# Patient Record
Sex: MALE | Race: BLACK OR AFRICAN AMERICAN | NOT HISPANIC OR LATINO | Employment: UNEMPLOYED | URBAN - NONMETROPOLITAN AREA
[De-identification: names, ages, dates, MRNs, and addresses within clinical notes are randomized per-mention and may not be internally consistent; named-entity substitution may affect disease eponyms.]

---

## 2018-09-30 ENCOUNTER — HOSPITAL ENCOUNTER (EMERGENCY)
Facility: HOSPITAL | Age: 33
Discharge: HOME | End: 2018-09-30
Admitting: EMERGENCY MEDICINE

## 2018-09-30 ENCOUNTER — APPOINTMENT (OUTPATIENT)
Dept: GENERAL RADIOLOGY | Facility: HOSPITAL | Age: 33
End: 2018-09-30

## 2018-09-30 VITALS
HEART RATE: 108 BPM | HEIGHT: 75 IN | TEMPERATURE: 98.6 F | OXYGEN SATURATION: 98 % | BODY MASS INDEX: 24.87 KG/M2 | SYSTOLIC BLOOD PRESSURE: 153 MMHG | WEIGHT: 200 LBS | DIASTOLIC BLOOD PRESSURE: 98 MMHG | RESPIRATION RATE: 15 BRPM

## 2018-09-30 DIAGNOSIS — S93.402A SPRAIN OF LEFT ANKLE, UNSPECIFIED LIGAMENT, INITIAL ENCOUNTER: Primary | ICD-10-CM

## 2018-09-30 PROCEDURE — 99283 EMERGENCY DEPT VISIT LOW MDM: CPT

## 2018-09-30 PROCEDURE — 73630 X-RAY EXAM OF FOOT: CPT

## 2018-09-30 PROCEDURE — 73610 X-RAY EXAM OF ANKLE: CPT

## 2018-09-30 RX ORDER — NAPROXEN 500 MG/1
500 TABLET ORAL 2 TIMES DAILY PRN
Qty: 20 TABLET | Refills: 0 | Status: SHIPPED | OUTPATIENT
Start: 2018-09-30

## 2018-09-30 RX ORDER — OXYCODONE HYDROCHLORIDE AND ACETAMINOPHEN 5; 325 MG/1; MG/1
1 TABLET ORAL EVERY 4 HOURS PRN
Qty: 12 TABLET | Refills: 0 | Status: SHIPPED | OUTPATIENT
Start: 2018-09-30

## 2018-10-02 NOTE — ED NOTES
Call placed to patient per directions of Sandi MELCHOR. Patient informed of tiny fracture at distal tibula and that patient should follow up at [facility]. Patient verbalized understanding.      Pepito Tan  10/02/18 0961

## 2018-10-03 ENCOUNTER — HOSPITAL ENCOUNTER (EMERGENCY)
Facility: HOSPITAL | Age: 33
Discharge: HOME | End: 2018-10-03
Admitting: NURSE PRACTITIONER

## 2018-10-03 VITALS
TEMPERATURE: 97.9 F | SYSTOLIC BLOOD PRESSURE: 136 MMHG | HEIGHT: 75 IN | WEIGHT: 233.2 LBS | DIASTOLIC BLOOD PRESSURE: 97 MMHG | OXYGEN SATURATION: 98 % | BODY MASS INDEX: 29 KG/M2 | HEART RATE: 114 BPM | RESPIRATION RATE: 16 BRPM

## 2018-10-03 DIAGNOSIS — S82.892A CLOSED AVULSION FRACTURE OF LEFT ANKLE, INITIAL ENCOUNTER: Primary | ICD-10-CM

## 2018-10-03 PROCEDURE — 99282 EMERGENCY DEPT VISIT SF MDM: CPT

## 2018-10-03 RX ORDER — KETOROLAC TROMETHAMINE 10 MG/1
10 TABLET, FILM COATED ORAL EVERY 6 HOURS PRN
Qty: 15 TABLET | Refills: 0 | Status: SHIPPED | OUTPATIENT
Start: 2018-10-03

## 2018-10-03 NOTE — DISCHARGE INSTRUCTIONS
Return to ER if symptoms worsen   Elevate/ ice     Ankle Exercises  Ask your health care provider which exercises are safe for you. Do exercises exactly as told by your health care provider and adjust them as directed. It is normal to feel mild stretching, pulling, tightness, or discomfort as you do these exercises, but you should stop right away if you feel sudden pain or your pain gets worse. Do not begin these exercises until told by your health care provider.  Stretching and range of motion exercises  These exercises warm up your muscles and joints and improve the movement and flexibility of your ankle. These exercises also help to relieve pain, numbness, and tingling.  Exercise A: Dorsiflexion/Plantar Flexion    1. Sit with your __________ knee straight or bent. Do not rest your foot on anything.  2. Flex your __________ ankle to tilt the top of your foot toward your shin.  3. Hold this position for __________ seconds.  4. Point your toes downward to tilt the top of your foot away from your shin.  5. Hold this position for __________ seconds.  Repeat __________ times. Complete this exercise __________ times a day.  Exercise B: Ankle Alphabet    1. Sit with your __________ foot supported at your lower leg.  ? Do not rest your foot on anything.  ? Make sure your foot has room to move freely.  2. Think of your __________ foot as a paintbrush, and move your foot to trace each letter of the alphabet in the air. Keep your hip and knee still while you trace. Make the letters as large as you can without increasing any discomfort.  3. Trace every letter from A to Z.  Repeat __________ times. Complete this exercise __________ times a day.  Exercise C: Ankle Dorsiflexion, Passive  1. Sit on a chair that is placed on a non-carpeted surface.  2. Place your __________ foot on the floor, directly under your __________ knee. Extend your __________ leg for support.  3. Keeping your heel down, slide your __________ foot back  toward the chair until you feel a stretch at your ankle or calf. If you do not feel a stretch, slide your buttocks forward to the edge of the chair.  4. Hold this stretch for __________ seconds.  Repeat __________ times. Complete this stretch __________ times a day.  Strengthening exercises  These exercises build strength and endurance in your ankle. Endurance is the ability to use your muscles for a long time, even after they get tired.  Exercise D: Dorsiflexors    1. Secure a rubber exercise band or tube to an object, such as a table leg, that will stay still when the band is pulled. Secure the other end around your __________ foot.  2. Sit on the floor, facing the object with your __________ leg extended. The band or tube should be slightly tense when your foot is relaxed.  3. Slowly flex your __________ ankle and toes to bring your foot toward you.  4. Hold this position for __________ seconds.  5. Slowly return your foot to the starting position, controlling the band as you do that.  Repeat __________ times. Complete this exercise __________ times a day.  Exercise E: Plantar Flexors    1. Sit on the floor with your __________ leg extended.  2. Loop a rubber exercise band or tube around the ball of your __________ foot. The ball of your foot is on the walking surface, right under your toes. The band or tube should be slightly tense when your foot is relaxed.  3. Slowly point your toes downward, pushing them away from you.  4. Hold this position for __________ seconds.  5. Slowly release the tension in the band or tube, controlling smoothly until your foot is back in the starting position.  Repeat __________ times. Complete this exercise __________ times a day.  Exercise F: Towel Curls    1. Sit in a chair on a non-carpeted surface, and put your feet on the floor.  2. Place a towel in front of your feet. If told by your health care provider, add __________ to the end of the towel.  3. Keeping your heel on the  floor, put your __________ foot on the towel.  4. Pull the towel toward you by grabbing the towel with your toes and curling them under. Keep your heel on the floor.  5. Let your toes relax.  6. Grab the towel again. Keep going until the towel is completely underneath your foot.  Repeat __________ times. Complete this exercise __________ times a day.  Exercise G: Heel Raise (  Plantar Flexors, Standing)  1. Stand with your feet shoulder-width apart.  2. Keep your weight spread evenly over the width of your feet while you rise up on your toes. Use a wall or table to steady yourself, but try not to use it for support.  3. If this exercise is too easy, try these options:  ? Shift your weight toward your __________ leg until you feel challenged.  ? If told by your health care provider, lift your uninjured leg off the floor.  4. Hold this position for __________ seconds.  Repeat __________ times. Complete this exercise __________ times a day.  Exercise H: Tandem Walking  1. Stand with one foot directly in front of the other.  2. Slowly raise your back foot up, lifting your heel before your toes, and place it directly in front of your other foot.  3. Continue to walk in this heel-to-toe way for __________ or for as long as told by your health care provider. Have a countertop or wall nearby to use if needed to keep your balance, but try not to hold onto anything for support.  Repeat __________ times. Complete this exercises __________ times a day.  This information is not intended to replace advice given to you by your health care provider. Make sure you discuss any questions you have with your health care provider.  Document Released: 11/01/2006 Document Revised: 08/17/2017 Document Reviewed: 09/04/2016  [employer]vier Interactive Patient Education © 2018 [employer]

## 2018-10-03 NOTE — ED PROVIDER NOTES
Subjective   Patient is a 33-year-old black male presents with persistent left ankle pain.  Patient was seen here on September 30 and x-ray revealed tiny nondisplaced fracture the tip of the distal fibula.  Patient has been in a walking boot since that time.  He states that he has tried to follow-up with orthopedics and the [facility] advised they would just put him in a boot and there was nothing else they could do.  He was unable to see a provider there at that time.  He states he is having more pain is not sure what else today.  He is here tonight to find out what else he can day.        History provided by:  Patient   used: No        Review of Systems   Constitutional: Negative.    HENT: Negative.    Eyes: Negative.    Respiratory: Negative.    Cardiovascular: Negative.    Gastrointestinal: Negative.    Endocrine: Negative.    Genitourinary: Negative.    Musculoskeletal:        Patient is a 33-year-old black male presents with persistent left ankle pain.  Patient was seen here on September 30 and x-ray revealed tiny nondisplaced fracture the tip of the distal fibula.  Patient has been in a walking boot since that time.  He states that he has tried to follow-up with orthopedics and the [facility] person at [facility] advised they would just put him in a boot and there was nothing else they could do.  He was unable to see a provider there at that time.  He states he is having more pain is not sure what else today.  He is here tonight to find out what else he can day.     Skin: Negative.    Allergic/Immunologic: Negative.    Neurological: Negative.    Hematological: Negative.    Psychiatric/Behavioral: Negative.    All other systems reviewed and are negative.      History reviewed. No pertinent past medical history.    No Known Allergies    History reviewed. No pertinent surgical history.    Family History   Problem Relation Age of Onset   • Heart  "disease Mother        Social History     Social History   • Marital status: Single     Social History Main Topics   • Smoking status: Former Smoker   • Alcohol use No   • Drug use: No     Other Topics Concern   • Not on file       Prior to Admission medications    Medication Sig Start Date End Date Taking? Authorizing Provider   naproxen (NAPROSYN) 500 MG tablet Take 1 tablet by mouth 2 (Two) Times a Day As Needed for Mild Pain . 9/30/18   Sandi Temple APRN   oxyCODONE-acetaminophen (PERCOCET) 5-325 MG per tablet Take 1 tablet by mouth Every 4 (Four) Hours As Needed for Moderate Pain . 9/30/18   Sandi Temple APRN       /97 (BP Location: Right arm, Patient Position: Sitting)   Pulse 114   Temp 97.9 °F (36.6 °C) (Temporal Artery )   Resp 16   Ht 190.5 cm (75\")   Wt 106 kg (233 lb 3.2 oz)   SpO2 98%   BMI 29.15 kg/m²     Objective   Physical Exam   Constitutional: He is oriented to person, place, and time. He appears well-developed and well-nourished.   HENT:   Head: Normocephalic and atraumatic.   Eyes: Pupils are equal, round, and reactive to light. Conjunctivae and EOM are normal.   Neck: Normal range of motion. Neck supple.   Cardiovascular: Normal rate, regular rhythm, normal heart sounds and intact distal pulses.    Pulmonary/Chest: Effort normal and breath sounds normal.   Abdominal: Soft. Bowel sounds are normal.   Musculoskeletal: Normal range of motion.   Walking boot intact. Removed. Pedal pulses palp. Mild soft tissue swelling noted.    Neurological: He is alert and oriented to person, place, and time. He has normal reflexes.   Skin: Skin is warm and dry.   Psychiatric: He has a normal mood and affect. His behavior is normal. Judgment and thought content normal.   Nursing note and vitals reviewed.      Procedures         Lab Results (last 24 hours)     ** No results found for the last 24 hours. **          No orders to display       ED Course  ED Course as of Oct 03 1732 "   Wed Oct 03, 2018   1637 Had case management come and talk with patient about follow-up for his ankle.  Patient does have [place] Medicaid and advised to probably have to follow up with his PCP in [place] for referral to orthopedics.  Patient is requesting further pain medication area and advised would not be able to prescribe any narcotic pain medication for this issue.  Will write for an anti-inflammatory.  And have him follow-up with his PCP.  [CW]      ED Course User Index  [CW] Sandi Temple, RUIZ          MDM  Number of Diagnoses or Management Options  Closed avulsion fracture of left ankle, initial encounter: minor  Patient Progress  Patient progress: stable      Final diagnoses:   Closed avulsion fracture of left ankle, initial encounter          Sandi Temple, RUIZ  10/03/18 [id number]